# Patient Record
Sex: FEMALE | Race: WHITE | NOT HISPANIC OR LATINO | Employment: FULL TIME | URBAN - METROPOLITAN AREA
[De-identification: names, ages, dates, MRNs, and addresses within clinical notes are randomized per-mention and may not be internally consistent; named-entity substitution may affect disease eponyms.]

---

## 2024-05-28 ENCOUNTER — HOSPITAL ENCOUNTER (OUTPATIENT)
Facility: HOSPITAL | Age: 34
Discharge: HOME | End: 2024-05-28
Attending: EMERGENCY MEDICINE | Admitting: EMERGENCY MEDICINE
Payer: MEDICAID

## 2024-05-28 VITALS
HEART RATE: 86 BPM | TEMPERATURE: 98.3 F | WEIGHT: 171.2 LBS | SYSTOLIC BLOOD PRESSURE: 120 MMHG | OXYGEN SATURATION: 100 % | BODY MASS INDEX: 28.52 KG/M2 | HEIGHT: 65 IN | DIASTOLIC BLOOD PRESSURE: 69 MMHG | RESPIRATION RATE: 18 BRPM

## 2024-05-28 DIAGNOSIS — J01.00 ACUTE NON-RECURRENT MAXILLARY SINUSITIS: Primary | ICD-10-CM

## 2024-05-28 LAB
FLUAV SUBTYP SPEC NAA+PROBE: NOT DETECTED
FLUBV RNA ISLT QL NAA+PROBE: NOT DETECTED
SARS-COV-2 RNA RESP QL NAA+PROBE: NOT DETECTED
STREP A PCR: NOT DETECTED

## 2024-05-28 PROCEDURE — G0463 HOSPITAL OUTPT CLINIC VISIT: HCPCS

## 2024-05-28 PROCEDURE — 87651 STREP A DNA AMP PROBE: CPT | Performed by: EMERGENCY MEDICINE

## 2024-05-28 PROCEDURE — 87636 SARSCOV2 & INF A&B AMP PRB: CPT | Performed by: EMERGENCY MEDICINE

## 2024-05-28 PROCEDURE — 25010000002 KETOROLAC TROMETHAMINE PER 15 MG

## 2024-05-28 PROCEDURE — 99203 OFFICE O/P NEW LOW 30 MIN: CPT

## 2024-05-28 RX ORDER — DOXYCYCLINE 100 MG/1
100 CAPSULE ORAL 2 TIMES DAILY
Qty: 20 CAPSULE | Refills: 0 | Status: SHIPPED | OUTPATIENT
Start: 2024-05-28 | End: 2024-06-07

## 2024-05-28 RX ORDER — KETOROLAC TROMETHAMINE 30 MG/ML
30 INJECTION, SOLUTION INTRAMUSCULAR; INTRAVENOUS ONCE
Status: COMPLETED | OUTPATIENT
Start: 2024-05-28 | End: 2024-05-28

## 2024-05-28 RX ORDER — FLUTICASONE PROPIONATE 50 MCG
2 SPRAY, SUSPENSION (ML) NASAL DAILY
Qty: 11.1 ML | Refills: 0 | Status: SHIPPED | OUTPATIENT
Start: 2024-05-28

## 2024-05-28 RX ORDER — FLUCONAZOLE 150 MG/1
150 TABLET ORAL ONCE
Qty: 1 TABLET | Refills: 0 | Status: SHIPPED | OUTPATIENT
Start: 2024-05-28 | End: 2024-05-28

## 2024-05-28 RX ADMIN — KETOROLAC TROMETHAMINE 30 MG: 30 INJECTION, SOLUTION INTRAMUSCULAR at 13:50

## 2024-05-28 NOTE — FSED PROVIDER NOTE
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  She reports taking Tylenol this morning but continues to have headache pain.  She is denying vomiting and diarrhea.  She reports bilateral sinus pressure below her eyes.  She is on Vivitrol.        Review of Systems   All other systems reviewed and are negative.      History reviewed. No pertinent past medical history.    Allergies   Allergen Reactions    Amoxicillin Other (See Comments)     \"Flu like symptoms\"     Demerol [Meperidine] Other (See Comments)     \"Feels like fire in my veins\"       History reviewed. No pertinent surgical history.    History reviewed. No pertinent family history.    Social History     Socioeconomic History    Marital status:            Objective   Physical Exam  Vitals and nursing note reviewed.   HENT:      Head: Normocephalic and atraumatic.      Ears:      Comments: Tympanic membrane's bilaterally normal in appearance no swelling no redness noted     Nose: Congestion (Red nasal membranes clear discharge) present.      Mouth/Throat:      Mouth: Mucous membranes are moist.      Pharynx: Oropharynx is clear.   Eyes:      Conjunctiva/sclera: Conjunctivae normal.      Pupils: Pupils are equal, round, and reactive to light.   Cardiovascular:      Rate and Rhythm: Normal rate.      Heart sounds: Normal heart sounds.   Pulmonary:      Effort: Pulmonary effort is normal. No respiratory distress.      Breath sounds: Normal breath sounds. No stridor. No wheezing, rhonchi or rales.   Chest:      Chest wall: No tenderness.   Abdominal:      Palpations: Abdomen is soft.   Musculoskeletal:      Cervical back: Normal range of motion and neck supple.   Skin:     General: Skin is warm.   Neurological:      General: No focal deficit present. "      Mental Status: She is alert.         Procedures           ED Course  ED Course as of 05/28/24 1435   Tue May 28, 2024   1344 Strep is negative [WF]   1352 COVID influenza and strep are all negative [WF]      ED Course User Index  [WF] Rene Vazquez Jr., URSZULA                                           Medical Decision Making  Patient appears to have viral URI will provide injection of Toradol for her body aches    Problems Addressed:  Acute non-recurrent maxillary sinusitis: complicated acute illness or injury    Risk  Prescription drug management.        Final diagnoses:   Acute non-recurrent maxillary sinusitis       ED Disposition  ED Disposition       ED Disposition   Discharge    Condition   Stable    Comment   --               Provider, No Known  [facility] [place] [address]               Medication List        New Prescriptions      doxycycline 100 MG capsule  Commonly known as: MONODOX  Take 1 capsule by mouth 2 (Two) Times a Day for 10 days.     fluconazole 150 MG tablet  Commonly known as: DIFLUCAN  Take 1 tablet by mouth 1 (One) Time for 1 dose.     fluticasone 50 MCG/ACT nasal spray  Commonly known as: FLONASE  2 sprays into the nostril(s) as directed by provider Daily.               Where to Get Your Medications        These medications were sent to [facility] - [place], [place] - [address] - [phone]  - [phone] FX  [address] [place] [address]      Phone: [phone]   doxycycline 100 MG capsule  fluconazole 150 MG tablet  fluticasone 50 MCG/ACT nasal spray

## 2024-05-28 NOTE — DISCHARGE INSTRUCTIONS
Take ibuprofen and or Tylenol as needed for fever and body ache    Increase your fluid intake with Pedialyte Gatorade water    If you continue to have sinus pressure nasal congestion beyond 3 to 5 days start doxycycline antibiotic    Follow-up with your family doctor as needed return to ER for worsening symptom

## 2024-09-08 ENCOUNTER — HOSPITAL ENCOUNTER (EMERGENCY)
Facility: HOSPITAL | Age: 34
Discharge: HOME | End: 2024-09-08
Attending: EMERGENCY MEDICINE | Admitting: EMERGENCY MEDICINE
Payer: MEDICAID

## 2024-09-08 VITALS
RESPIRATION RATE: 18 BRPM | WEIGHT: 191 LBS | HEART RATE: 82 BPM | SYSTOLIC BLOOD PRESSURE: 140 MMHG | BODY MASS INDEX: 31.82 KG/M2 | OXYGEN SATURATION: 99 % | HEIGHT: 65 IN | TEMPERATURE: 98 F | DIASTOLIC BLOOD PRESSURE: 83 MMHG

## 2024-09-08 DIAGNOSIS — R51.9 ACUTE NONINTRACTABLE HEADACHE, UNSPECIFIED HEADACHE TYPE: Primary | ICD-10-CM

## 2024-09-08 LAB — HCG SERPL QL: NEGATIVE

## 2024-09-08 PROCEDURE — 25010000002 DIPHENHYDRAMINE PER 50 MG

## 2024-09-08 PROCEDURE — 25810000003 SODIUM CHLORIDE 0.9 % SOLUTION

## 2024-09-08 PROCEDURE — 96374 THER/PROPH/DIAG INJ IV PUSH: CPT

## 2024-09-08 PROCEDURE — 25010000002 METOCLOPRAMIDE PER 10 MG

## 2024-09-08 PROCEDURE — 96361 HYDRATE IV INFUSION ADD-ON: CPT

## 2024-09-08 PROCEDURE — 25010000002 DEXAMETHASONE SODIUM PHOSPHATE 10 MG/ML SOLUTION

## 2024-09-08 PROCEDURE — 84703 CHORIONIC GONADOTROPIN ASSAY: CPT

## 2024-09-08 PROCEDURE — 99283 EMERGENCY DEPT VISIT LOW MDM: CPT

## 2024-09-08 PROCEDURE — 25010000002 KETOROLAC TROMETHAMINE PER 15 MG

## 2024-09-08 PROCEDURE — 96375 TX/PRO/DX INJ NEW DRUG ADDON: CPT

## 2024-09-08 RX ORDER — DEXAMETHASONE SODIUM PHOSPHATE 10 MG/ML
10 INJECTION, SOLUTION INTRAMUSCULAR; INTRAVENOUS ONCE
Status: COMPLETED | OUTPATIENT
Start: 2024-09-08 | End: 2024-09-08

## 2024-09-08 RX ORDER — KETOROLAC TROMETHAMINE 30 MG/ML
15 INJECTION, SOLUTION INTRAMUSCULAR; INTRAVENOUS ONCE
Status: COMPLETED | OUTPATIENT
Start: 2024-09-08 | End: 2024-09-08

## 2024-09-08 RX ORDER — METOCLOPRAMIDE HYDROCHLORIDE 5 MG/ML
10 INJECTION INTRAMUSCULAR; INTRAVENOUS ONCE
Status: COMPLETED | OUTPATIENT
Start: 2024-09-08 | End: 2024-09-08

## 2024-09-08 RX ORDER — SODIUM CHLORIDE 0.9 % (FLUSH) 0.9 %
10 SYRINGE (ML) INJECTION AS NEEDED
Status: DISCONTINUED | OUTPATIENT
Start: 2024-09-08 | End: 2024-09-08 | Stop reason: HOSPADM

## 2024-09-08 RX ORDER — DIPHENHYDRAMINE HYDROCHLORIDE 50 MG/ML
25 INJECTION INTRAMUSCULAR; INTRAVENOUS ONCE
Status: COMPLETED | OUTPATIENT
Start: 2024-09-08 | End: 2024-09-08

## 2024-09-08 RX ADMIN — METOCLOPRAMIDE 10 MG: 5 INJECTION, SOLUTION INTRAMUSCULAR; INTRAVENOUS at 13:47

## 2024-09-08 RX ADMIN — KETOROLAC TROMETHAMINE 15 MG: 30 INJECTION, SOLUTION INTRAMUSCULAR at 13:47

## 2024-09-08 RX ADMIN — DIPHENHYDRAMINE HYDROCHLORIDE 25 MG: 50 INJECTION, SOLUTION INTRAMUSCULAR; INTRAVENOUS at 13:47

## 2024-09-08 RX ADMIN — SODIUM CHLORIDE 1000 ML: 9 INJECTION, SOLUTION INTRAVENOUS at 13:21

## 2024-09-08 RX ADMIN — DEXAMETHASONE SODIUM PHOSPHATE 10 MG: 10 INJECTION, SOLUTION INTRAMUSCULAR; INTRAVENOUS at 13:47

## 2024-09-08 NOTE — FSED PROVIDER NOTE
[facility]    EMERGENCY [facility] ENCOUNTER    Room Number:  DARIAN/DARIAN  Date seen:  9/8/2024  Time seen: 13:19 EDT  PCP: Provider, No Known  Historian: Patient    HPI:  Chief complaint: Migraine  Context:Amber Gooden is a 33 y.o. female who presents to the ED with c/o migraine.  Patient reports that she started to have a migraine last night.  Reports that she has been having some nausea and vomiting today.  She reports that she is light sensitive.  Patient reports history of migraines but reports that she does not have any medication to take for her migraines.  She reports that she has taken sumatriptan in the past.  Patient denies any vision changes, unilateral weakness, slurred speech.  Patient is nontoxic in appearance.  She is able to answer questions appropriately.    Timing: Constant  Duration: 1 day  Location: Head  Intensity/Severity: Moderate  Associated Symptoms: Migraine, nausea vomiting  Aggravating Factors: Light  Alleviating Factors: No known alleviating      MEDICAL RECORD REVIEW  Migraines    ALLERGIES  Amoxicillin and Demerol [meperidine]    PAST MEDICAL HISTORY  Active Ambulatory Problems     Diagnosis Date Noted    No Active Ambulatory Problems     Resolved Ambulatory Problems     Diagnosis Date Noted    No Resolved Ambulatory Problems     No Additional Past Medical History       PAST SURGICAL HISTORY  No past surgical history on file.    FAMILY HISTORY  No family history on file.    SOCIAL HISTORY  Social History     Socioeconomic History    Marital status:        REVIEW OF SYSTEMS  Review of Systems    All systems reviewed and negative except for those discussed in HPI.     PHYSICAL EXAM    I have reviewed the triage vital signs and nursing notes.    ED Triage Vitals [09/08/24 1255]   Temp Heart Rate Resp BP SpO2   98 °F (36.7 °C) 82 18 140/83 99 %      Temp src Heart Rate Source Patient Position BP Location FiO2 (%)   -- Monitor Sitting Right arm --        Physical Exam  Constitutional:       Appearance: Normal appearance. She is not toxic-appearing.   HENT:      Nose: Nose normal.      Mouth/Throat:      Mouth: Mucous membranes are moist.   Eyes:      Extraocular Movements: Extraocular movements intact.      Pupils: Pupils are equal, round, and reactive to light.   Cardiovascular:      Rate and Rhythm: Normal rate and regular rhythm.      Pulses: Normal pulses.      Heart sounds: Normal heart sounds.   Pulmonary:      Effort: Pulmonary effort is normal.      Breath sounds: Normal breath sounds.   Musculoskeletal:         General: Normal range of motion.      Cervical back: Normal range of motion. No rigidity or tenderness.   Skin:     General: Skin is warm.   Neurological:      General: No focal deficit present.      Mental Status: She is alert.      GCS: GCS eye subscore is 4. GCS verbal subscore is 5. GCS motor subscore is 6.      Sensory: Sensation is intact.      Motor: Motor function is intact.      Coordination: Coordination is intact.      Gait: Gait is intact.   Psychiatric:         Mood and Affect: Mood normal.         Behavior: Behavior normal.         Vital signs and nursing notes reviewed.        LAB RESULTS  Recent Results (from the past 24 hour(s))   hCG, Serum, Qualitative    Collection Time: 09/08/24  1:16 PM    Specimen: Blood   Result Value Ref Range    HCG Qualitative Negative Negative       Ordered the above labs and independently reviewed the results.      RADIOLOGY RESULTS  No Radiology Exams Resulted Within Past 24 Hours       I ordered the above noted radiological studies. Independently reviewed by me and discussed with radiologist.  See dictation above for official radiology interpretation.      Orders placed during this visit:  Orders Placed This Encounter   Procedures    hCG, Serum, Qualitative    Insert peripheral IV    ED Acknowledgement Form Needed;           PROCEDURES    Procedures        MEDICATIONS GIVEN IN ER    Medications    sodium chloride 0.9 % flush 10 mL (has no administration in time range)   sodium chloride 0.9 % bolus 1,000 mL (1,000 mL Intravenous New Bag 9/8/24 1321)   metoclopramide (REGLAN) injection 10 mg (10 mg Intravenous Given 9/8/24 1347)   diphenhydrAMINE (BENADRYL) injection 25 mg (25 mg Intravenous Given 9/8/24 1347)   ketorolac (TORADOL) injection 15 mg (15 mg Intravenous Given 9/8/24 1347)   dexAMETHasone sodium phosphate injection 10 mg (10 mg Intravenous Given 9/8/24 1347)         PROGRESS, DATA ANALYSIS, CONSULTS, AND MEDICAL DECISION MAKING    All labs and radiology studies have been independently reviewed by me.     ED Course as of 09/08/24 1446   Sun Sep 08, 2024   1344 HCG Qualitative: Negative [KJ]      ED Course User Index  [KJ] Candice Paige APRN       AS OF 14:46 EDT VITALS:    BP - 140/83  HR - 82  TEMP - 98 °F (36.7 °C)  02 SATS - 99%    Medical Decision Making  MEDICAL DECISION  Patient is a 33-year-old female who presents today with migraine.  Patient had an IV established and was given medication with IV fluids.  This patient presents with a headache most consistent with benign headache from either tension type headache vs migraine. No headache red flags. Neurologic exam without evidence of meningismus, AMS, focal neurologic findings so doubt meningitis, encephalitis, stroke. Presentation not consistent with acute intracranial bleed to include SAH (lack of risk factors, headache history). No history of trauma so doubt ICH. Given history and physical temporal arteritis unlikely, as is acute angle closure glaucoma. Doubt carotid artery dissection given no focal neuro deficits, no neck trauma or recent neck strain. Patient with no signs of increased intracranial pressure or weight loss and history and physical suggest more benign headache so less likely mass effect in brain from tumor or abscess or idiopathic intracranial hypertension. Pain was controlled with headache cocktail and patient  discharged home with PMD follow up.    Problems Addressed:  Acute nonintractable headache, unspecified headache type: complicated acute illness or injury    Amount and/or Complexity of Data Reviewed  Labs:  Decision-making details documented in ED Course.    Risk  Prescription drug management.          DIAGNOSIS  Final diagnoses:   Acute nonintractable headache, unspecified headache type       New Medications Ordered This Visit   Medications    sodium chloride 0.9 % flush 10 mL    sodium chloride 0.9 % bolus 1,000 mL    metoclopramide (REGLAN) injection 10 mg    diphenhydrAMINE (BENADRYL) injection 25 mg    ketorolac (TORADOL) injection 15 mg    dexAMETHasone sodium phosphate injection 10 mg           I performed hand hygiene on entry into the pt room and upon exit.      Part of this note may be an electronic transcription/translation of spoken language to printed text using the Dragon Dictation System.     Appropriate PPE worn during exam.    Dictated utilizing Fitlyon dictation     Note Disclaimer: At [facility], we believe that sharing information builds trust and better relationships. You are receiving this note because you recently visited [facility]. It is possible you will see health information before a provider has talked with you about it. This kind of information can be easy to misunderstand. To help you fully understand what it means for your health, we urge you to discuss this note with your provider.

## 2024-09-08 NOTE — DISCHARGE INSTRUCTIONS
Thank you for letting us care for you today.  Make sure you are drinking plenty fluids and getting rest today.  Follow-up with your primary care provider for continued evaluation.  Return the emergency room for any new or worsening symptoms.

## 2024-09-08 NOTE — Clinical Note
[facility] [place] [address] [place] [address] [place] [address]  Phone: [phone]    Amber Gooden was seen and treated in our emergency department on 9/8/2024.  She may return to work on 09/10/2024.         Thank you for choosing [facility].    Candice Paige APRN

## 2024-11-26 ENCOUNTER — HOSPITAL ENCOUNTER (EMERGENCY)
Facility: HOSPITAL | Age: 34
Discharge: HOME | End: 2024-11-26
Attending: EMERGENCY MEDICINE | Admitting: EMERGENCY MEDICINE
Payer: MEDICAID

## 2024-11-26 ENCOUNTER — APPOINTMENT (OUTPATIENT)
Dept: CT IMAGING | Facility: HOSPITAL | Age: 34
End: 2024-11-26
Payer: MEDICAID

## 2024-11-26 VITALS
WEIGHT: 192 LBS | TEMPERATURE: 98.7 F | DIASTOLIC BLOOD PRESSURE: 73 MMHG | HEART RATE: 78 BPM | RESPIRATION RATE: 16 BRPM | OXYGEN SATURATION: 100 % | BODY MASS INDEX: 31.99 KG/M2 | HEIGHT: 65 IN | SYSTOLIC BLOOD PRESSURE: 115 MMHG

## 2024-11-26 DIAGNOSIS — R10.9 LEFT FLANK PAIN: Primary | ICD-10-CM

## 2024-11-26 LAB
ALBUMIN SERPL-MCNC: 4.5 G/DL (ref 3.5–5.2)
ALBUMIN/GLOB SERPL: 1.8 G/DL
ALP SERPL-CCNC: 50 U/L (ref 39–117)
ALT SERPL W P-5'-P-CCNC: 34 U/L (ref 1–33)
ANION GAP SERPL CALCULATED.3IONS-SCNC: 9.1 MMOL/L (ref 5–15)
AST SERPL-CCNC: 18 U/L (ref 1–32)
B-HCG UR QL: NEGATIVE
BACTERIA UR QL AUTO: ABNORMAL /HPF
BASOPHILS # BLD AUTO: 0.03 10*3/MM3 (ref 0–0.2)
BASOPHILS NFR BLD AUTO: 0.2 % (ref 0–1.5)
BILIRUB SERPL-MCNC: <0.2 MG/DL (ref 0–1.2)
BILIRUB UR QL STRIP: NEGATIVE
BUN SERPL-MCNC: 13 MG/DL (ref 6–20)
BUN/CREAT SERPL: 17.6 (ref 7–25)
CALCIUM SPEC-SCNC: 9.5 MG/DL (ref 8.6–10.5)
CHLORIDE SERPL-SCNC: 103 MMOL/L (ref 98–107)
CLARITY UR: CLEAR
CO2 SERPL-SCNC: 24.9 MMOL/L (ref 22–29)
COLOR UR: YELLOW
CREAT SERPL-MCNC: 0.74 MG/DL (ref 0.57–1)
DEPRECATED RDW RBC AUTO: 42.2 FL (ref 37–54)
EGFRCR SERPLBLD CKD-EPI 2021: 109 ML/MIN/1.73
EOSINOPHIL # BLD AUTO: 0.29 10*3/MM3 (ref 0–0.4)
EOSINOPHIL NFR BLD AUTO: 2.2 % (ref 0.3–6.2)
ERYTHROCYTE [DISTWIDTH] IN BLOOD BY AUTOMATED COUNT: 12.8 % (ref 12.3–15.4)
GLOBULIN UR ELPH-MCNC: 2.5 GM/DL
GLUCOSE SERPL-MCNC: 88 MG/DL (ref 65–99)
GLUCOSE UR STRIP-MCNC: NEGATIVE MG/DL
HCT VFR BLD AUTO: 39.6 % (ref 34–46.6)
HGB BLD-MCNC: 12.7 G/DL (ref 12–15.9)
HGB UR QL STRIP.AUTO: NEGATIVE
HYALINE CASTS UR QL AUTO: ABNORMAL /LPF
IMM GRANULOCYTES # BLD AUTO: 0.03 10*3/MM3 (ref 0–0.05)
IMM GRANULOCYTES NFR BLD AUTO: 0.2 % (ref 0–0.5)
KETONES UR QL STRIP: NEGATIVE
LEUKOCYTE ESTERASE UR QL STRIP.AUTO: NEGATIVE
LIPASE SERPL-CCNC: 22 U/L (ref 13–60)
LYMPHOCYTES # BLD AUTO: 2.22 10*3/MM3 (ref 0.7–3.1)
LYMPHOCYTES NFR BLD AUTO: 16.6 % (ref 19.6–45.3)
MCH RBC QN AUTO: 28.7 PG (ref 26.6–33)
MCHC RBC AUTO-ENTMCNC: 32.1 G/DL (ref 31.5–35.7)
MCV RBC AUTO: 89.6 FL (ref 79–97)
MONOCYTES # BLD AUTO: 0.92 10*3/MM3 (ref 0.1–0.9)
MONOCYTES NFR BLD AUTO: 6.9 % (ref 5–12)
NEUTROPHILS NFR BLD AUTO: 73.9 % (ref 42.7–76)
NEUTROPHILS NFR BLD AUTO: 9.86 10*3/MM3 (ref 1.7–7)
NITRITE UR QL STRIP: NEGATIVE
PH UR STRIP.AUTO: 6 [PH] (ref 5–8)
PLATELET # BLD AUTO: 346 10*3/MM3 (ref 140–450)
PMV BLD AUTO: 10.3 FL (ref 6–12)
POTASSIUM SERPL-SCNC: 3.6 MMOL/L (ref 3.5–5.2)
PROT SERPL-MCNC: 7 G/DL (ref 6–8.5)
PROT UR QL STRIP: ABNORMAL
RBC # BLD AUTO: 4.42 10*6/MM3 (ref 3.77–5.28)
RBC # UR STRIP: ABNORMAL /HPF
REF LAB TEST METHOD: ABNORMAL
SODIUM SERPL-SCNC: 137 MMOL/L (ref 136–145)
SP GR UR STRIP: 1.02 (ref 1–1.03)
SQUAMOUS #/AREA URNS HPF: ABNORMAL /HPF
UROBILINOGEN UR QL STRIP: ABNORMAL
WBC # UR STRIP: ABNORMAL /HPF
WBC NRBC COR # BLD AUTO: 13.35 10*3/MM3 (ref 3.4–10.8)

## 2024-11-26 PROCEDURE — 96361 HYDRATE IV INFUSION ADD-ON: CPT

## 2024-11-26 PROCEDURE — 96374 THER/PROPH/DIAG INJ IV PUSH: CPT

## 2024-11-26 PROCEDURE — 81001 URINALYSIS AUTO W/SCOPE: CPT

## 2024-11-26 PROCEDURE — 25010000002 KETOROLAC TROMETHAMINE PER 15 MG

## 2024-11-26 PROCEDURE — 80053 COMPREHEN METABOLIC PANEL: CPT

## 2024-11-26 PROCEDURE — 25810000003 SODIUM CHLORIDE 0.9 % SOLUTION

## 2024-11-26 PROCEDURE — 25510000001 IOPAMIDOL PER 1 ML: Performed by: EMERGENCY MEDICINE

## 2024-11-26 PROCEDURE — 83690 ASSAY OF LIPASE: CPT

## 2024-11-26 PROCEDURE — 51798 US URINE CAPACITY MEASURE: CPT

## 2024-11-26 PROCEDURE — 81025 URINE PREGNANCY TEST: CPT | Performed by: EMERGENCY MEDICINE

## 2024-11-26 PROCEDURE — 85025 COMPLETE CBC W/AUTO DIFF WBC: CPT

## 2024-11-26 PROCEDURE — 74177 CT ABD & PELVIS W/CONTRAST: CPT

## 2024-11-26 PROCEDURE — 99285 EMERGENCY DEPT VISIT HI MDM: CPT

## 2024-11-26 RX ORDER — SODIUM CHLORIDE 0.9 % (FLUSH) 0.9 %
10 SYRINGE (ML) INJECTION AS NEEDED
Status: DISCONTINUED | OUTPATIENT
Start: 2024-11-26 | End: 2024-11-26 | Stop reason: HOSPADM

## 2024-11-26 RX ORDER — METHOCARBAMOL 750 MG/1
750 TABLET, FILM COATED ORAL 3 TIMES DAILY PRN
Qty: 12 TABLET | Refills: 0 | Status: SHIPPED | OUTPATIENT
Start: 2024-11-26

## 2024-11-26 RX ORDER — KETOROLAC TROMETHAMINE 30 MG/ML
15 INJECTION, SOLUTION INTRAMUSCULAR; INTRAVENOUS ONCE
Status: COMPLETED | OUTPATIENT
Start: 2024-11-26 | End: 2024-11-26

## 2024-11-26 RX ORDER — IOPAMIDOL 755 MG/ML
100 INJECTION, SOLUTION INTRAVASCULAR
Status: COMPLETED | OUTPATIENT
Start: 2024-11-26 | End: 2024-11-26

## 2024-11-26 RX ADMIN — IOPAMIDOL 100 ML: 755 INJECTION, SOLUTION INTRAVENOUS at 19:25

## 2024-11-26 RX ADMIN — KETOROLAC TROMETHAMINE 15 MG: 30 INJECTION, SOLUTION INTRAMUSCULAR at 19:56

## 2024-11-26 RX ADMIN — SODIUM CHLORIDE 1000 ML: 9 INJECTION, SOLUTION INTRAVENOUS at 19:30

## 2024-11-27 NOTE — DISCHARGE INSTRUCTIONS
You can use Tylenol and Motrin as needed for pain. You can use the Robaxin as prescribed. Make sure you are drinking plenty of fluids.  Follow-up with the patient care connection to establish a primary care provider.  You can use ice or heat to the sore area for 20 minutes at a time.  Return to the emergency room for any fever, worsening pain, difficulty urinating or any other concerning symptoms.

## 2024-11-27 NOTE — FSED PROVIDER NOTE
[facility]    EMERGENCY DEPARTMENT ENCOUNTER    Room Number:  04/04  Date seen:  11/26/2024  Time seen: 20:30 EST  PCP: Provider, No Known  Historian: Patient    HPI:  Chief complaint: Left flank pain  Context:Amber Gooden is a 33 y.o. female who presents to the ED with c/o left flank pain.  Patient reports that she has been having left flank pain and burning with urination for the last week.  She reports that she feels like she may have a kidney infection and was concerned.  She reports that she has been drinking a lot of fluids but it does not seem to be helping.  Patient denies any alleviating or exacerbating factors to the flank pain.  She denies any nausea vomiting diarrhea.  Patient denies any radiation of pain.  She reports the pain is intermittent.    Timing: Intermittent  Duration: 1 week  Location: Left flank  Intensity/Severity: Moderate  Associated Symptoms:left flank pain, dysuria      MEDICAL RECORD REVIEW  Seasonal allergies    ALLERGIES  Amoxicillin and Demerol [meperidine]    PAST MEDICAL HISTORY  Active Ambulatory Problems     Diagnosis Date Noted    No Active Ambulatory Problems     Resolved Ambulatory Problems     Diagnosis Date Noted    No Resolved Ambulatory Problems     No Additional Past Medical History       PAST SURGICAL HISTORY  History reviewed. No pertinent surgical history.    FAMILY HISTORY  History reviewed. No pertinent family history.    SOCIAL HISTORY  Social History     Socioeconomic History    Marital status:        REVIEW OF SYSTEMS  Review of Systems    All systems reviewed and negative except for those discussed in HPI.     PHYSICAL EXAM    I have reviewed the triage vital signs and nursing notes.    ED Triage Vitals [11/26/24 1755]   Temp Heart Rate Resp BP SpO2   98.7 °F (37.1 °C) 88 16 125/77 99 %      Temp src Heart Rate Source Patient Position BP Location FiO2 (%)   Oral Monitor Sitting Right arm --       Physical  Exam  Constitutional:       Appearance: Normal appearance. She is not toxic-appearing.   HENT:      Nose: Nose normal.      Mouth/Throat:      Mouth: Mucous membranes are moist.   Eyes:      Extraocular Movements: Extraocular movements intact.      Conjunctiva/sclera: Conjunctivae normal.      Pupils: Pupils are equal, round, and reactive to light.   Cardiovascular:      Rate and Rhythm: Normal rate and regular rhythm.      Pulses: Normal pulses.      Heart sounds: Normal heart sounds.   Pulmonary:      Effort: Pulmonary effort is normal.      Breath sounds: Normal breath sounds.   Abdominal:      General: Bowel sounds are normal.      Palpations: Abdomen is soft.      Tenderness: There is no abdominal tenderness. There is no right CVA tenderness, left CVA tenderness, guarding or rebound.   Musculoskeletal:         General: Normal range of motion.      Cervical back: Normal range of motion.   Skin:     General: Skin is warm.   Neurological:      General: No focal deficit present.      Mental Status: She is alert.   Psychiatric:         Mood and Affect: Mood normal.         Behavior: Behavior normal.         Vital signs and nursing notes reviewed.        LAB RESULTS  Recent Results (from the past 24 hours)   Urinalysis without microscopic (no culture) - Urine, Clean Catch    Collection Time: 11/26/24  5:57 PM    Specimen: Urine, Clean Catch   Result Value Ref Range    Color, UA Yellow Yellow, Straw    Appearance, UA Clear Clear    pH, UA 6.0 5.0 - 8.0    Specific Gravity, UA 1.025 1.005 - 1.030    Glucose, UA Negative Negative    Ketones, UA Negative Negative    Bilirubin, UA Negative Negative    Blood, UA Negative Negative    Protein, UA Trace (A) Negative    Leuk Esterase, UA Negative Negative    Nitrite, UA Negative Negative    Urobilinogen, UA 0.2 E.U./dL 0.2 - 1.0 E.U./dL   Pregnancy, Urine - Urine, Clean Catch    Collection Time: 11/26/24  5:57 PM    Specimen: Urine, Clean Catch   Result Value Ref Range    HCG,  Urine QL Negative Negative   Comprehensive Metabolic Panel    Collection Time: 11/26/24  6:50 PM    Specimen: Blood   Result Value Ref Range    Glucose 88 65 - 99 mg/dL    BUN 13 6 - 20 mg/dL    Creatinine 0.74 0.57 - 1.00 mg/dL    Sodium 137 136 - 145 mmol/L    Potassium 3.6 3.5 - 5.2 mmol/L    Chloride 103 98 - 107 mmol/L    CO2 24.9 22.0 - 29.0 mmol/L    Calcium 9.5 8.6 - 10.5 mg/dL    Total Protein 7.0 6.0 - 8.5 g/dL    Albumin 4.5 3.5 - 5.2 g/dL    ALT (SGPT) 34 (H) 1 - 33 U/L    AST (SGOT) 18 1 - 32 U/L    Alkaline Phosphatase 50 39 - 117 U/L    Total Bilirubin <0.2 0.0 - 1.2 mg/dL    Globulin 2.5 gm/dL    A/G Ratio 1.8 g/dL    BUN/Creatinine Ratio 17.6 7.0 - 25.0    Anion Gap 9.1 5.0 - 15.0 mmol/L    eGFR 109.0 >60.0 mL/min/1.73   Lipase    Collection Time: 11/26/24  6:50 PM    Specimen: Blood   Result Value Ref Range    Lipase 22 13 - 60 U/L   CBC Auto Differential    Collection Time: 11/26/24  6:50 PM    Specimen: Blood   Result Value Ref Range    WBC 13.35 (H) 3.40 - 10.80 10*3/mm3    RBC 4.42 3.77 - 5.28 10*6/mm3    Hemoglobin 12.7 12.0 - 15.9 g/dL    Hematocrit 39.6 34.0 - 46.6 %    MCV 89.6 79.0 - 97.0 fL    MCH 28.7 26.6 - 33.0 pg    MCHC 32.1 31.5 - 35.7 g/dL    RDW 12.8 12.3 - 15.4 %    RDW-SD 42.2 37.0 - 54.0 fl    MPV 10.3 6.0 - 12.0 fL    Platelets 346 140 - 450 10*3/mm3    Neutrophil % 73.9 42.7 - 76.0 %    Lymphocyte % 16.6 (L) 19.6 - 45.3 %    Monocyte % 6.9 5.0 - 12.0 %    Eosinophil % 2.2 0.3 - 6.2 %    Basophil % 0.2 0.0 - 1.5 %    Immature Grans % 0.2 0.0 - 0.5 %    Neutrophils, Absolute 9.86 (H) 1.70 - 7.00 10*3/mm3    Lymphocytes, Absolute 2.22 0.70 - 3.10 10*3/mm3    Monocytes, Absolute 0.92 (H) 0.10 - 0.90 10*3/mm3    Eosinophils, Absolute 0.29 0.00 - 0.40 10*3/mm3    Basophils, Absolute 0.03 0.00 - 0.20 10*3/mm3    Immature Grans, Absolute 0.03 0.00 - 0.05 10*3/mm3       Ordered the above labs and independently reviewed the results.      RADIOLOGY RESULTS  CT Abdomen Pelvis With  Contrast    Result Date: 11/26/2024  CT ABDOMEN PELVIS W CONTRAST Date of Exam: 11/26/2024 7:13 PM EST Indication: back pain, urinary symptoms. Comparison: None available. Technique: Axial CT images were obtained of the abdomen and pelvis following the uneventful intravenous administration of iodinated contrast. Sagittal and coronal reconstructions were performed.  Automated exposure control and iterative reconstruction methods were used. FINDINGS: Lung bases: No masses. No consolidation. Liver:No masses. No intrahepatic biliary ductal dilatation. Spleen:No masses. No perisplenic hematoma. Pancreas:No pancreatic masses. No evidence of pancreatitis. Gallbladder and common bile duct:No evidence of cholelithiasis. No evidence of cholecystitis. Adrenal glands:No adrenal masses Kidneys and ureters:No kidney stones. No renal masses.No calculi present within the ureters. Normal caliber ureters. Urinary bladder:No urinary bladder wall thickening. No bladder masses. Small bowel:Normal caliber small bowel. Large bowel:No diverticulosis or diverticulitis. No large bowel masses are appreciated Appendix: Normal GENITOURINARY: Normal appearance of the uterus and adnexa. Ascites or pneumoperitoneum:None. Adenopathy:None present Osseous structures: The pubic bones are intact. The proximal femurs are intact. The sacrum and sacroiliac joints are normal. The spinous and transverse processes are intact.. Other findings: None     No acute abdominal or pelvic pathology. Electronically Signed: Royal Yang MD  11/26/2024 7:41 PM EST  Workstation ID: [id number]        I ordered the above noted radiological studies. Independently reviewed by me and discussed with radiologist.  See dictation above for official radiology interpretation.      Orders placed during this visit:  Orders Placed This Encounter   Procedures    CT Abdomen Pelvis With Contrast    Urinalysis without microscopic (no culture) - Urine, Clean Catch    Pregnancy, Urine -  Urine, Clean Catch    Comprehensive Metabolic Panel    Lipase    CBC Auto Differential    [place] Urine Culture Tube -    Urinalysis, Microscopic Only - Urine, Clean Catch    Ambulatory Referral to Family Practice    NPO Diet NPO Type: Strict NPO    Undress & Gown    Bladder scan    Insert Peripheral IV    CBC & Differential    ED Acknowledgement Form Needed;           PROCEDURES    Procedures        MEDICATIONS GIVEN IN ER    Medications   sodium chloride 0.9 % flush 10 mL (has no administration in time range)   sodium chloride 0.9 % bolus 1,000 mL (1,000 mL Intravenous New Bag 11/26/24 1930)   iopamidol (ISOVUE-370) 76 % injection 100 mL (100 mL Intravenous Given 11/26/24 1925)   ketorolac (TORADOL) injection 15 mg (15 mg Intravenous Given 11/26/24 1956)         PROGRESS, DATA ANALYSIS, CONSULTS, AND MEDICAL DECISION MAKING    All labs and radiology studies have been independently reviewed by me.     ED Course as of 11/26/24 2035   Tue Nov 26, 2024   1856 WBC(!): 13.35 [KJ]      ED Course User Index  [KJ] Candice Paige, URSZULA       AS OF 20:35 EST VITALS:    BP - 115/73  HR - 78  TEMP - 98.7 °F (37.1 °C) (Oral)  02 SATS - 100%    Medical Decision Making  Patient is a 33 year old female who presents with left flank pain. Patient had an IV established and blood work was obtained. Given history, exam, and work up I have low suspicion for atypical appendicitis, acute cholecystitis, AAA, infected obstructed stone, pyelonephritis, or other emergent intraabdominal pathology. Symptoms and UA indicate no infection.  Patient had a normal CT scan.  Patient was given IV fluids and Toradol.  I will send the patient home with a prescription for Robaxin and have her follow-up with her primary care provider.  She was given return precautions with understanding.    Problems Addressed:  Left flank pain: complicated acute illness or injury    Amount and/or Complexity of Data Reviewed  Labs: ordered. Decision-making details  documented in ED Course.  Radiology: ordered.    Risk  Prescription drug management.          DIAGNOSIS  Final diagnoses:   Left flank pain       New Medications Ordered This Visit   Medications    sodium chloride 0.9 % flush 10 mL    sodium chloride 0.9 % bolus 1,000 mL    iopamidol (ISOVUE-370) 76 % injection 100 mL    ketorolac (TORADOL) injection 15 mg    methocarbamol (ROBAXIN) 750 MG tablet     Sig: Take 1 tablet by mouth 3 (Three) Times a Day As Needed for Muscle Spasms.     Dispense:  12 tablet     Refill:  0           I performed hand hygiene on entry into the pt room and upon exit.      Part of this note may be an electronic transcription/translation of spoken language to printed text using the Dragon Dictation System.     Appropriate PPE worn during exam.    Dictated utilizing Dragon dictation     Note Disclaimer: At [facility], we believe that sharing information builds trust and better relationships. You are receiving this note because you recently visited [facility]. It is possible you will see health information before a provider has talked with you about it. This kind of information can be easy to misunderstand. To help you fully understand what it means for your health, we urge you to discuss this note with your provider.

## 2024-12-17 ENCOUNTER — LAB (OUTPATIENT)
Dept: FAMILY MEDICINE CLINIC | Facility: CLINIC | Age: 34
End: 2024-12-17
Payer: MEDICAID

## 2024-12-17 ENCOUNTER — OFFICE VISIT (OUTPATIENT)
Dept: FAMILY MEDICINE CLINIC | Facility: CLINIC | Age: 34
End: 2024-12-17
Payer: MEDICAID

## 2024-12-17 VITALS
BODY MASS INDEX: 34.89 KG/M2 | WEIGHT: 209.4 LBS | SYSTOLIC BLOOD PRESSURE: 100 MMHG | RESPIRATION RATE: 16 BRPM | DIASTOLIC BLOOD PRESSURE: 64 MMHG | OXYGEN SATURATION: 98 % | HEIGHT: 65 IN | TEMPERATURE: 98.5 F | HEART RATE: 68 BPM

## 2024-12-17 DIAGNOSIS — Z00.00 HEALTHCARE MAINTENANCE: ICD-10-CM

## 2024-12-17 DIAGNOSIS — Z00.00 HEALTHCARE MAINTENANCE: Primary | ICD-10-CM

## 2024-12-17 DIAGNOSIS — Z87.19 H/O CHRONIC HEPATITIS: ICD-10-CM

## 2024-12-17 DIAGNOSIS — F34.1 PERSISTENT DEPRESSIVE DISORDER: ICD-10-CM

## 2024-12-17 DIAGNOSIS — F43.10 PTSD (POST-TRAUMATIC STRESS DISORDER): ICD-10-CM

## 2024-12-17 DIAGNOSIS — F39 MOOD DISORDER: ICD-10-CM

## 2024-12-17 DIAGNOSIS — F51.01 PRIMARY INSOMNIA: ICD-10-CM

## 2024-12-17 DIAGNOSIS — F41.9 ANXIETY: ICD-10-CM

## 2024-12-17 DIAGNOSIS — G43.801 OTHER MIGRAINE WITH STATUS MIGRAINOSUS, NOT INTRACTABLE: ICD-10-CM

## 2024-12-17 DIAGNOSIS — F10.21 ALCOHOLISM IN RECOVERY: ICD-10-CM

## 2024-12-17 PROBLEM — G43.901 MIGRAINE WITH STATUS MIGRAINOSUS, NOT INTRACTABLE: Status: ACTIVE | Noted: 2024-12-17

## 2024-12-17 LAB
BASOPHILS # BLD AUTO: 0.04 10*3/MM3 (ref 0–0.2)
BASOPHILS NFR BLD AUTO: 0.5 % (ref 0–1.5)
BILIRUB UR QL STRIP: NEGATIVE
CLARITY UR: CLEAR
COLOR UR: YELLOW
DEPRECATED RDW RBC AUTO: 39.8 FL (ref 37–54)
EOSINOPHIL # BLD AUTO: 0.32 10*3/MM3 (ref 0–0.4)
EOSINOPHIL NFR BLD AUTO: 3.7 % (ref 0.3–6.2)
ERYTHROCYTE [DISTWIDTH] IN BLOOD BY AUTOMATED COUNT: 12.4 % (ref 12.3–15.4)
GLUCOSE UR STRIP-MCNC: NEGATIVE MG/DL
HCT VFR BLD AUTO: 41 % (ref 34–46.6)
HGB BLD-MCNC: 13.5 G/DL (ref 12–15.9)
HGB UR QL STRIP.AUTO: NEGATIVE
HOLD SPECIMEN: NORMAL
IMM GRANULOCYTES # BLD AUTO: 0.03 10*3/MM3 (ref 0–0.05)
IMM GRANULOCYTES NFR BLD AUTO: 0.4 % (ref 0–0.5)
KETONES UR QL STRIP: NEGATIVE
LEUKOCYTE ESTERASE UR QL STRIP.AUTO: NEGATIVE
LYMPHOCYTES # BLD AUTO: 2.05 10*3/MM3 (ref 0.7–3.1)
LYMPHOCYTES NFR BLD AUTO: 23.9 % (ref 19.6–45.3)
MCH RBC QN AUTO: 29.1 PG (ref 26.6–33)
MCHC RBC AUTO-ENTMCNC: 32.9 G/DL (ref 31.5–35.7)
MCV RBC AUTO: 88.4 FL (ref 79–97)
MONOCYTES # BLD AUTO: 0.86 10*3/MM3 (ref 0.1–0.9)
MONOCYTES NFR BLD AUTO: 10 % (ref 5–12)
NEUTROPHILS NFR BLD AUTO: 5.26 10*3/MM3 (ref 1.7–7)
NEUTROPHILS NFR BLD AUTO: 61.5 % (ref 42.7–76)
NITRITE UR QL STRIP: NEGATIVE
NRBC BLD AUTO-RTO: 0 /100 WBC (ref 0–0.2)
PH UR STRIP.AUTO: 7 [PH] (ref 5–8)
PLATELET # BLD AUTO: 280 10*3/MM3 (ref 140–450)
PMV BLD AUTO: 11.9 FL (ref 6–12)
PROT UR QL STRIP: NEGATIVE
RBC # BLD AUTO: 4.64 10*6/MM3 (ref 3.77–5.28)
SP GR UR STRIP: 1.01 (ref 1–1.03)
UROBILINOGEN UR QL STRIP: NORMAL
WBC NRBC COR # BLD AUTO: 8.56 10*3/MM3 (ref 3.4–10.8)

## 2024-12-17 PROCEDURE — 80053 COMPREHEN METABOLIC PANEL: CPT | Performed by: NURSE PRACTITIONER

## 2024-12-17 PROCEDURE — 81003 URINALYSIS AUTO W/O SCOPE: CPT | Performed by: NURSE PRACTITIONER

## 2024-12-17 PROCEDURE — 85025 COMPLETE CBC W/AUTO DIFF WBC: CPT | Performed by: NURSE PRACTITIONER

## 2024-12-17 PROCEDURE — 87522 HEPATITIS C REVRS TRNSCRPJ: CPT | Performed by: NURSE PRACTITIONER

## 2024-12-17 PROCEDURE — 82306 VITAMIN D 25 HYDROXY: CPT | Performed by: NURSE PRACTITIONER

## 2024-12-17 PROCEDURE — 84466 ASSAY OF TRANSFERRIN: CPT | Performed by: NURSE PRACTITIONER

## 2024-12-17 PROCEDURE — 83540 ASSAY OF IRON: CPT | Performed by: NURSE PRACTITIONER

## 2024-12-17 PROCEDURE — 83036 HEMOGLOBIN GLYCOSYLATED A1C: CPT | Performed by: NURSE PRACTITIONER

## 2024-12-17 PROCEDURE — 36415 COLL VENOUS BLD VENIPUNCTURE: CPT | Performed by: NURSE PRACTITIONER

## 2024-12-17 PROCEDURE — 80061 LIPID PANEL: CPT | Performed by: NURSE PRACTITIONER

## 2024-12-17 PROCEDURE — 82607 VITAMIN B-12: CPT | Performed by: NURSE PRACTITIONER

## 2024-12-17 PROCEDURE — 82728 ASSAY OF FERRITIN: CPT | Performed by: NURSE PRACTITIONER

## 2024-12-17 RX ORDER — CLONIDINE HYDROCHLORIDE 0.1 MG/1
0.1 TABLET ORAL
COMMUNITY
Start: 2024-11-20

## 2024-12-17 RX ORDER — VILOXAZINE HYDROCHLORIDE 100 MG/1
1 CAPSULE, EXTENDED RELEASE ORAL DAILY
COMMUNITY
Start: 2024-09-11 | End: 2024-12-17

## 2024-12-17 RX ORDER — SUMATRIPTAN 50 MG/1
TABLET, FILM COATED ORAL
COMMUNITY
Start: 2024-09-24

## 2024-12-17 RX ORDER — GABAPENTIN 300 MG/1
1 CAPSULE ORAL DAILY
COMMUNITY
Start: 2024-11-20

## 2024-12-17 RX ORDER — DOCUSATE SODIUM 100 MG/1
100 CAPSULE, LIQUID FILLED ORAL 2 TIMES DAILY PRN
COMMUNITY
Start: 2024-11-20

## 2024-12-17 RX ORDER — HYDROXYZINE PAMOATE 50 MG/1
1 CAPSULE ORAL 3 TIMES DAILY
COMMUNITY
Start: 2024-11-20

## 2024-12-17 RX ORDER — NALTREXONE HYDROCHLORIDE 50 MG/1
1 TABLET, FILM COATED ORAL DAILY
COMMUNITY
Start: 2024-08-30 | End: 2024-12-17

## 2024-12-17 RX ORDER — BREXPIPRAZOLE 2 MG/1
1 TABLET ORAL DAILY
COMMUNITY
Start: 2024-11-20

## 2024-12-17 RX ORDER — CETIRIZINE HYDROCHLORIDE 10 MG/1
10 TABLET ORAL AS NEEDED
COMMUNITY
Start: 2024-10-30

## 2024-12-17 RX ORDER — NALTREXONE 380 MG
KIT INTRAMUSCULAR
COMMUNITY
Start: 2024-12-02

## 2024-12-17 RX ORDER — PRAZOSIN HYDROCHLORIDE 2 MG/1
2 CAPSULE ORAL
COMMUNITY
Start: 2024-11-20

## 2024-12-17 NOTE — PROGRESS NOTES
"Chief Complaint  Establish Care and Flank Pain (States that this was the reason for appt but that this has resolved. )    Subjective        Amber Gooden presents to [facility]  History of Present Illness  32 y/o  female presents to  office, to establish care.  Patient reports h/o 17+ years of alcoholism and cocaine.  She has been 8+ months drug and alcohol free.  She reports drug of choice was cocaine, but admits to multi-substance abuse.  Patient states that she was in half-way for 8 months for drug related charges starting end of 2022.  Patient states that she was born and raised in [place] and moved down to [place] about 8 months ago.  She did rehab at [facility], then transitioned to [facility] and is currently in a sober living house.  Patient states that she has a mental health provider that she meets via telehealth - DENISE (Aviva Miller, Addiction Specialist).  She reports monthly meeting with Aviva Miller.  Flank Pain  This is a new problem. The current episode started 1 to 4 weeks ago. The problem occurs intermittently. The problem has been improved since onset. The quality of the pain is described as shooting and stabbing. The patient is experiencing no pain. The symptoms are aggravated by twisting and position. Pertinent negatives include no abdominal pain, bladder incontinence, bowel incontinence, dysuria, fever or pelvic pain. Risk factors include obesity. She has tried heat, bed rest, home exercises and NSAIDs for the symptoms. The treatment provided moderate relief.       Objective   Vital Signs:  /64 (BP Location: Left arm, Patient Position: Sitting, Cuff Size: Adult)   Pulse 68   Temp 98.5 °F (36.9 °C) (Oral)   Resp 16   Ht 165.1 cm (65\")   Wt 95 kg (209 lb 6.4 oz)   SpO2 98%   BMI 34.85 kg/m²   Estimated body mass index is 34.85 kg/m² as calculated from the following:    Height as of this encounter: 165.1 cm (65\").    Weight as of this " encounter: 95 kg (209 lb 6.4 oz).      Physical Exam  Vitals reviewed.   Constitutional:       General: She is not in acute distress.     Appearance: Normal appearance. She is not ill-appearing, toxic-appearing or diaphoretic.   HENT:      Head: Normocephalic and atraumatic.      Right Ear: Tympanic membrane, ear canal and external ear normal.      Left Ear: Tympanic membrane, ear canal and external ear normal.      Nose: Nose normal. No congestion or rhinorrhea.      Mouth/Throat:      Mouth: Mucous membranes are moist.      Pharynx: Oropharynx is clear.   Eyes:      Extraocular Movements: Extraocular movements intact.      Conjunctiva/sclera: Conjunctivae normal.      Pupils: Pupils are equal, round, and reactive to light.   Cardiovascular:      Rate and Rhythm: Normal rate and regular rhythm.      Pulses: Normal pulses.      Heart sounds: Normal heart sounds.   Pulmonary:      Effort: Pulmonary effort is normal.      Breath sounds: Normal breath sounds. No wheezing, rhonchi or rales.   Abdominal:      General: Bowel sounds are normal.      Palpations: Abdomen is soft.      Tenderness: There is no abdominal tenderness. There is no right CVA tenderness, left CVA tenderness, guarding or rebound.   Musculoskeletal:         General: Normal range of motion.      Right lower leg: No edema.      Left lower leg: No edema.   Skin:     General: Skin is warm and dry.      Capillary Refill: Capillary refill takes less than 2 seconds.   Neurological:      General: No focal deficit present.      Mental Status: She is alert and oriented to person, place, and time.      Motor: No weakness.      Gait: Gait normal.   Psychiatric:         Mood and Affect: Mood normal.         Behavior: Behavior normal.         Thought Content: Thought content normal.         Judgment: Judgment normal.        Result Review :         Assessment and Plan   Diagnoses and all orders for this visit:    1. Healthcare maintenance (Primary)  -     CBC &  Differential  -     Comprehensive Metabolic Panel  -     Lipid Panel  -     Urinalysis With Culture If Indicated - Urine, Clean Catch  -     Hemoglobin A1c; Future  -     Ferritin  -     Iron Profile  -     Vitamin D,25-Hydroxy  -     Vitamin B12  -     Hepatitis C RNA, Quantitative, PCR (graph)  -     Ambulatory Referral to Gynecology  -     Fluzone >6mos ([id number])  -     Mammo Screening Digital Tomosynthesis Bilateral With CAD; Future  -     Cancel: POC Pregnancy, Urine  -     Pregnancy, Urine - Urine, Clean Catch; Future  -     [place] Urine Culture Tube - Urine, Clean Catch    2. H/O chronic hepatitis  Assessment & Plan:  Patient reports h/o Hep C.    Orders:  -     Hepatitis C RNA, Quantitative, PCR (graph)    3. Alcoholism in recovery  Assessment & Plan:  Continue monthly Vivitrol, as directed and prescribed by Mental Health Provider.      4. Anxiety  Assessment & Plan:  Continue Clonidine and hydroxyzine, as directed and prescribed by Mental Health provider.      5. Mood disorder  Assessment & Plan:  Continue gabapentin, as directed and prescribed by Mental Health Provider.      6. Persistent depressive disorder  Assessment & Plan:  Patient's depression is a recurrent episode that is moderate without psychosis. Depression is active and improving with treatment.    Plan:   Continue current medication therapy   Rexulti    Followup in 2 weeks.       7. PTSD (post-traumatic stress disorder)  Assessment & Plan:  Psychological condition is improving with treatment.  Continue current treatment regimen.  Psychological condition  will be reassessed in 2 weeks.    Continue Prazosin, rexulti, clonidine, as directed and hydroxyzine, as needed.      8. Other migraine with status migrainosus, not intractable  Assessment & Plan:  Headaches are stable.    Plan:  Continue same medication/s without change.     Discussed medication dosage, use, side effects, and goals of treatment in detail.    Discussed monitoring symptoms  and use of quick-relief medications and maintenance medication.  Counseled patient on lifestyle modifications to help control headaches.     General Treatment Goals:   symptom prevention  minimize work absence  minimizing limitation in activity  prevention of exacerbations  decrease use of ER/inpatient care  minimization of adverse effects of treatment    Followup in 6 months      IMITREX, as directed.            9. Primary insomnia  Assessment & Plan:  Continue Prazosin, clonidine, as directed and hydroxyzine, as needed.        Current Outpatient Medications on File Prior to Visit   Medication Sig Dispense Refill    cetirizine (zyrTEC) 10 MG tablet Take 1 tablet by mouth As Needed.      cloNIDine (CATAPRES) 0.1 MG tablet Take 1 tablet by mouth every night at bedtime.      docusate sodium (COLACE) 100 MG capsule Take 1 capsule by mouth 2 (Two) Times a Day As Needed for Constipation.      gabapentin (NEURONTIN) 300 MG capsule Take 1 capsule by mouth Daily.      hydrOXYzine pamoate (VISTARIL) 50 MG capsule Take 1 capsule by mouth 3 times a day.      prazosin (MINIPRESS) 2 MG capsule Take 1 capsule by mouth every night at bedtime.      Rexulti 2 MG tablet Take 1 tablet by mouth Daily.      SUMAtriptan (IMITREX) 50 MG tablet TAKE 1 TABLET BY MOUTH AS DIRECTED. MAY REPEAT DOSE AFTER 1 HOUR NO MORE THAN 2 TABLETS IN 1 DAY      Vivitrol 380 MG reconstituted suspension IM suspension INJECT 380MG AS AN INTRAMUSCULAR GLUTEAL INJECTION EVERY 28 DAYS      [DISCONTINUED] fluticasone (FLONASE) 50 MCG/ACT nasal spray 2 sprays into the nostril(s) as directed by provider Daily. (Patient not taking: Reported on 12/17/2024) 11.1 mL 0    [DISCONTINUED] methocarbamol (ROBAXIN) 750 MG tablet Take 1 tablet by mouth 3 (Three) Times a Day As Needed for Muscle Spasms. (Patient not taking: Reported on 12/17/2024) 12 tablet 0    [DISCONTINUED] naltrexone (DEPADE) 50 MG tablet Take 1 tablet by mouth Daily. (Patient not taking: Reported on  12/17/2024)      [DISCONTINUED] Qelbree 100 MG capsule sustained-release 24 hr Take 1 capsule by mouth Daily. (Patient not taking: Reported on 12/17/2024)       No current facility-administered medications on file prior to visit.           I spent 45 minutes caring for  on this date of service. This time includes time spent by me in the following activities:obtaining and/or reviewing a separately obtained history, performing a medically appropriate examination and/or evaluation , counseling and educating the patient/family/caregiver, ordering medications, tests, or procedures, referring and communicating with other health care professionals , and documenting information in the medical record  Follow Up   Return in about 20 days (around 1/6/2025).  Patient was given instructions and counseling regarding her condition or for health maintenance advice. Please see specific information pulled into the AVS if appropriate.

## 2024-12-17 NOTE — ASSESSMENT & PLAN NOTE
Headaches are stable.    Plan:  Continue same medication/s without change.     Discussed medication dosage, use, side effects, and goals of treatment in detail.    Discussed monitoring symptoms and use of quick-relief medications and maintenance medication.  Counseled patient on lifestyle modifications to help control headaches.     General Treatment Goals:   symptom prevention  minimize work absence  minimizing limitation in activity  prevention of exacerbations  decrease use of ER/inpatient care  minimization of adverse effects of treatment    Followup in 6 months      IMITREX, as directed.

## 2024-12-17 NOTE — ASSESSMENT & PLAN NOTE
Patient's depression is a recurrent episode that is moderate without psychosis. Depression is active and improving with treatment.    Plan:   Continue current medication therapy   Rexulti    Followup in 2 weeks.

## 2024-12-17 NOTE — ASSESSMENT & PLAN NOTE
Psychological condition is improving with treatment.  Continue current treatment regimen.  Psychological condition  will be reassessed in 2 weeks.    Continue Prazosin, rexulti, clonidine, as directed and hydroxyzine, as needed.

## 2024-12-18 LAB
25(OH)D3 SERPL-MCNC: 26.9 NG/ML (ref 30–100)
ALBUMIN SERPL-MCNC: 4.6 G/DL (ref 3.5–5.2)
ALBUMIN/GLOB SERPL: 1.7 G/DL
ALP SERPL-CCNC: 47 U/L (ref 39–117)
ALT SERPL W P-5'-P-CCNC: 44 U/L (ref 1–33)
ANION GAP SERPL CALCULATED.3IONS-SCNC: 12 MMOL/L (ref 5–15)
AST SERPL-CCNC: 34 U/L (ref 1–32)
BILIRUB SERPL-MCNC: 0.3 MG/DL (ref 0–1.2)
BUN SERPL-MCNC: 12 MG/DL (ref 6–20)
BUN/CREAT SERPL: 13.5 (ref 7–25)
CALCIUM SPEC-SCNC: 9.6 MG/DL (ref 8.6–10.5)
CHLORIDE SERPL-SCNC: 100 MMOL/L (ref 98–107)
CHOLEST SERPL-MCNC: 170 MG/DL (ref 0–200)
CO2 SERPL-SCNC: 24 MMOL/L (ref 22–29)
CREAT SERPL-MCNC: 0.89 MG/DL (ref 0.57–1)
EGFRCR SERPLBLD CKD-EPI 2021: 87.4 ML/MIN/1.73
FERRITIN SERPL-MCNC: 39.2 NG/ML (ref 13–150)
GLOBULIN UR ELPH-MCNC: 2.7 GM/DL
GLUCOSE SERPL-MCNC: 75 MG/DL (ref 65–99)
HBA1C MFR BLD: 5.3 % (ref 4.8–5.6)
HDLC SERPL-MCNC: 41 MG/DL (ref 40–60)
IRON 24H UR-MRATE: 71 MCG/DL (ref 37–145)
IRON SATN MFR SERPL: 16 % (ref 20–50)
LDLC SERPL CALC-MCNC: 94 MG/DL (ref 0–100)
LDLC/HDLC SERPL: 2.16 {RATIO}
POTASSIUM SERPL-SCNC: 4.2 MMOL/L (ref 3.5–5.2)
PROT SERPL-MCNC: 7.3 G/DL (ref 6–8.5)
SODIUM SERPL-SCNC: 136 MMOL/L (ref 136–145)
TIBC SERPL-MCNC: 437 MCG/DL (ref 298–536)
TRANSFERRIN SERPL-MCNC: 293 MG/DL (ref 200–360)
TRIGL SERPL-MCNC: 202 MG/DL (ref 0–150)
VIT B12 BLD-MCNC: 411 PG/ML (ref 211–946)
VLDLC SERPL-MCNC: 35 MG/DL (ref 5–40)

## 2024-12-19 LAB
HCV RNA SERPL NAA+PROBE-ACNC: NORMAL IU/ML
REF LAB TEST REF RANGE: NORMAL

## 2024-12-20 NOTE — PROGRESS NOTES
-Blood counts with no anemia, no infection, and normal clotting cells.   -Electrolytes normal, blood sugar normal, liver and kidney function normal.   -No diabetes.   -Thyroid screening is normal.   -Cholesterol is elevated.  -Urine is normal (no signs of infection, blood, or kidney problem).    - Vitamin D level is low.  Recommend Vitamin D3 supplement.  I can prescribe or you may take over the counter.

## 2025-01-14 ENCOUNTER — APPOINTMENT (OUTPATIENT)
Dept: GENERAL RADIOLOGY | Facility: HOSPITAL | Age: 35
End: 2025-01-14
Payer: MEDICAID

## 2025-01-14 ENCOUNTER — HOSPITAL ENCOUNTER (OUTPATIENT)
Facility: HOSPITAL | Age: 35
Discharge: HOME | End: 2025-01-14
Attending: EMERGENCY MEDICINE | Admitting: EMERGENCY MEDICINE
Payer: MEDICAID

## 2025-01-14 VITALS
HEIGHT: 65 IN | WEIGHT: 208.1 LBS | OXYGEN SATURATION: 99 % | TEMPERATURE: 98.2 F | SYSTOLIC BLOOD PRESSURE: 112 MMHG | BODY MASS INDEX: 34.67 KG/M2 | HEART RATE: 101 BPM | RESPIRATION RATE: 16 BRPM | DIASTOLIC BLOOD PRESSURE: 65 MMHG

## 2025-01-14 DIAGNOSIS — S96.911A STRAIN OF RIGHT FOOT, INITIAL ENCOUNTER: Primary | ICD-10-CM

## 2025-01-14 PROCEDURE — G0463 HOSPITAL OUTPT CLINIC VISIT: HCPCS

## 2025-01-14 PROCEDURE — 73630 X-RAY EXAM OF FOOT: CPT

## 2025-01-14 NOTE — DISCHARGE INSTRUCTIONS
Recommend 400 to 600 mg ibuprofen twice daily you can take 1000 mg of Tylenol at noon for the next 4 days    Recommend alternating ice and heat therapy to the right foot to reduce swelling but also encourage good healthy blood flow    Recommend Ace wrap to reduce swelling in the evenings elevation of the right foot.    Wear the walking boot if you must return to work to give stability to your foot and support of the foot and healing    Follow-up with family doctor as needed return to ER for worsening symptoms

## 2025-01-14 NOTE — Clinical Note
[facility] [place] [address] [place] [address] [place] [address]  Phone: [phone]    Amber Gooden was seen and treated in our emergency department on 1/14/2025.  She may return to work on 01/17/2025.         Thank you for choosing [facility].    Rene Vazquez Jr., APRN

## 2025-01-14 NOTE — FSED PROVIDER NOTE
"Subjective   History of Present Illness  34-year-old female reports that she was walking in her driveway yesterday when she fell on her right foot and had her foot extended behind her.  She is reporting pain to the top side of her right foot.  She reports she works at [employer] and has to be on her feet for long periods of time.  She denies past injury or trauma to the right foot.        Review of Systems   All other systems reviewed and are negative.      No past medical history on file.    Allergies   Allergen Reactions    Amoxicillin Other (See Comments)     \"Flu like symptoms\"     Demerol [Meperidine] Other (See Comments)     \"Feels like fire in my veins\"       No past surgical history on file.    No family history on file.    Social History     Socioeconomic History    Marital status: Single   Tobacco Use    Smoking status: Some Days     Types: Cigarettes     Passive exposure: Past    Smokeless tobacco: Former   Vaping Use    Vaping status: Every Day    Substances: Nicotine    Devices: Disposable, Pre-filled or refillable cartridge   Substance and Sexual Activity    Alcohol use: Not Currently    Drug use: Never    Sexual activity: Yes     Partners: Male           Objective   Physical Exam  Vitals and nursing note reviewed.   Constitutional:       Appearance: Normal appearance. She is normal weight.   HENT:      Head: Normocephalic and atraumatic.      Nose: Nose normal.      Mouth/Throat:      Mouth: Mucous membranes are moist.   Eyes:      Extraocular Movements: Extraocular movements intact.      Conjunctiva/sclera: Conjunctivae normal.      Pupils: Pupils are equal, round, and reactive to light.   Cardiovascular:      Rate and Rhythm: Normal rate.      Pulses: Normal pulses.   Pulmonary:      Effort: Pulmonary effort is normal. No respiratory distress.      Breath sounds: Normal breath sounds.   Abdominal:      Palpations: Abdomen is soft.   Musculoskeletal:      Cervical back: Normal range of motion.      " Comments: Patient has tenderness on exam to the right midfoot region the calcaneus the lateral and medial malleoli regions are nontender.   Skin:     Capillary Refill: Capillary refill takes less than 2 seconds.      Comments: Patient has soft tissue swelling to the right midfoot, no obvious bruising   Neurological:      General: No focal deficit present.      Mental Status: She is alert and oriented to person, place, and time.         Procedures           ED Course  ED Course as of 01/14/25 1304   Tue Jan 14, 2025   1145 Right foot x-ray Impression:  Negative for acute osseous abnormality.   [WF]      ED Course User Index  [WF] Rene Vazquez Jr., URSZULA                                           Medical Decision Making  Differential diagnosis would include metatarsal fracture ankle fracture of the right foot or foot strain    X-ray of the right foot is negative for acute bony abnormality suspect foot strain since patient has to work over the next several days I have offered her a supportive cam boot and Ace wrap.  We talked about the need to increase over-the-counter medication with ibuprofen and Tylenol also alternate heat and cool therapy and she is agreeable to discharge.    Problems Addressed:  Strain of right foot, initial encounter: complicated acute illness or injury    Amount and/or Complexity of Data Reviewed  Radiology: ordered.        Final diagnoses:   Strain of right foot, initial encounter       ED Disposition  ED Disposition       ED Disposition   Discharge    Condition   Stable    Comment   --               Cristin Norman, APRN  [address]  [address] [place] [address] [place] [address]  [phone]          Sharif Jones II, MD  [address] [facility] [place] [address]  [phone]               Medication List      No changes were made to your prescriptions during this visit.

## 2025-02-26 ENCOUNTER — HOSPITAL ENCOUNTER (EMERGENCY)
Facility: HOSPITAL | Age: 35
Discharge: LEFT BEFORE DISCHARGE | End: 2025-02-27
Attending: EMERGENCY MEDICINE

## 2025-02-26 VITALS
TEMPERATURE: 99.1 F | WEIGHT: 211.86 LBS | HEART RATE: 99 BPM | BODY MASS INDEX: 35.3 KG/M2 | DIASTOLIC BLOOD PRESSURE: 97 MMHG | RESPIRATION RATE: 18 BRPM | HEIGHT: 65 IN | OXYGEN SATURATION: 99 % | SYSTOLIC BLOOD PRESSURE: 133 MMHG

## 2025-02-26 DIAGNOSIS — Z53.21 ELOPED FROM EMERGENCY DEPARTMENT: Primary | ICD-10-CM

## 2025-02-26 PROCEDURE — 99211 OFF/OP EST MAY X REQ PHY/QHP: CPT

## 2025-02-26 PROCEDURE — 99281 EMR DPT VST MAYX REQ PHY/QHP: CPT

## 2025-02-27 NOTE — ED NOTES
Pt 1:1 with this nurse. Pt evaluated by provider in triage and SI precautions discontinued. Pt does not have active thoughts of harming self at this time.

## 2025-02-27 NOTE — ED PROVIDER NOTES
"Subjective   Provider in Triage Note  34 yof with friend stating she's off her anxiety/depression medications x 3 weeks because she lost her insurance and she was having suicidal thoughts so they took her off her meds and recommended genesight testing. She denies any current hi/si/hallucinations.   No reported w/d symptoms.  Sober x 11 months.    Due to significant overcrowding in the emergency department patient was initially seen and evaluated in triage.  Provider in triage recommended patient placement in the treatment area to initiate therapy and movement to an ER bed as soon as possible.   Orders placed; medications will be deferred to main provider per protocol.        History of Present Illness    Review of Systems    No past medical history on file.    Allergies   Allergen Reactions    Amoxicillin Other (See Comments)     \"Flu like symptoms\"     Demerol [Meperidine] Other (See Comments)     \"Feels like fire in my veins\"       No past surgical history on file.    No family history on file.    Social History     Socioeconomic History    Marital status: Single   Tobacco Use    Smoking status: Some Days     Types: Cigarettes     Passive exposure: Past    Smokeless tobacco: Former   Vaping Use    Vaping status: Every Day    Substances: Nicotine    Devices: Disposable, Pre-filled or refillable cartridge   Substance and Sexual Activity    Alcohol use: Not Currently    Drug use: Never    Sexual activity: Yes     Partners: Male           Objective   Physical Exam    Procedures           ED Course                                                       Medical Decision Making      Final diagnoses:   Eloped from emergency department       ED Disposition  ED Disposition       ED Disposition   Eloped    Condition   --    Comment   --               No follow-up provider specified.       Medication List      No changes were made to your prescriptions during this visit.            Rosy Shi, APRN  02/27/25 0339    "

## 2025-04-24 ENCOUNTER — OFFICE VISIT (OUTPATIENT)
Dept: FAMILY MEDICINE CLINIC | Facility: CLINIC | Age: 35
End: 2025-04-24
Payer: MEDICAID

## 2025-04-24 VITALS
HEART RATE: 87 BPM | BODY MASS INDEX: 34.2 KG/M2 | RESPIRATION RATE: 16 BRPM | TEMPERATURE: 97.7 F | OXYGEN SATURATION: 98 % | HEIGHT: 65 IN | SYSTOLIC BLOOD PRESSURE: 120 MMHG | WEIGHT: 205.3 LBS | DIASTOLIC BLOOD PRESSURE: 76 MMHG

## 2025-04-24 DIAGNOSIS — N89.8 ITCHING IN THE VAGINAL AREA: ICD-10-CM

## 2025-04-24 DIAGNOSIS — N76.0 ACUTE VAGINITIS: Primary | ICD-10-CM

## 2025-04-24 RX ORDER — FLUCONAZOLE 100 MG/1
100 TABLET ORAL DAILY
Qty: 3 TABLET | Refills: 0 | Status: SHIPPED | OUTPATIENT
Start: 2025-04-24 | End: 2025-04-27

## 2025-04-24 RX ORDER — CLOTRIMAZOLE 1 %
CREAM WITH APPLICATOR VAGINAL DAILY
Qty: 45 G | Refills: 0 | Status: SHIPPED | OUTPATIENT
Start: 2025-04-24

## 2025-04-24 NOTE — PROGRESS NOTES
Silvia Gooden is a 34 y.o. female.     History of Present Illness     History of Present Illness  The patient is a 35-year-old female who presents with a complaint of vaginal itching.  She initially experienced vaginal discharge, which was interpreted as a potential yeast infection due to her predisposition to such conditions. In an attempt to preempt the onset of itching, burning, and other associated discomforts, Diflucan was self-administered last night ( previous left over prescription). However, this resulted in severe itching, disrupting sleep and necessitating a bath for relief. Upon awakening this morning, the itching resumed. The itching is described as internal. No rash at groin/genital area. She denies dysuria, urinary frequency and urgency.    The following portions of the patient's history were reviewed and updated as appropriate: past medical history, past social history, past surgical history and problem list.    Review of Systems   Genitourinary:  Negative for dysuria, frequency and urgency.        Vaginal itching.         Objective   Physical Exam  Vitals reviewed.   Genitourinary:     Vagina: No vaginal discharge, erythema or lesions.   Neurological:      Mental Status: She is alert.         Vitals:    04/24/25 1502   BP: 120/76   Pulse: 87   Resp: 16   Temp: 97.7 °F (36.5 °C)   SpO2: 98%     Current Outpatient Medications on File Prior to Visit   Medication Sig Dispense Refill    cetirizine (zyrTEC) 10 MG tablet Take 1 tablet by mouth As Needed.      cloNIDine (CATAPRES) 0.1 MG tablet Take 1 tablet by mouth every night at bedtime.      docusate sodium (COLACE) 100 MG capsule Take 1 capsule by mouth 2 (Two) Times a Day As Needed for Constipation.      hydrOXYzine pamoate (VISTARIL) 50 MG capsule Take 1 capsule by mouth 3 times a day.      prazosin (MINIPRESS) 2 MG capsule Take 1 capsule by mouth every night at bedtime.       No current facility-administered medications on file prior to  visit.         Assessment & Plan   Problems Addressed this Visit       Acute vaginitis - Primary    Relevant Medications    clotrimazole (LOTRIMIN) 1 % vaginal cream    fluconazole (Diflucan) 100 MG tablet    Itching in the vaginal area    Relevant Medications    clotrimazole (LOTRIMIN) 1 % vaginal cream    fluconazole (Diflucan) 100 MG tablet     Diagnoses         Codes Comments      Acute vaginitis    -  Primary ICD-10-CM: N76.0  ICD-9-CM: 616.10       Itching in the vaginal area     ICD-10-CM: N89.8  ICD-9-CM: 698.1                  Patient or patient representative verbalized consent for the use of Ambient Listening during the visit with  Lynn Wayne MD for chart documentation. 4/27/2025  15:16 EDT

## 2025-05-09 ENCOUNTER — HOSPITAL ENCOUNTER (OUTPATIENT)
Facility: HOSPITAL | Age: 35
Discharge: HOME | End: 2025-05-09
Attending: EMERGENCY MEDICINE | Admitting: EMERGENCY MEDICINE
Payer: MEDICAID

## 2025-05-09 VITALS
HEIGHT: 65 IN | BODY MASS INDEX: 34.2 KG/M2 | RESPIRATION RATE: 16 BRPM | DIASTOLIC BLOOD PRESSURE: 84 MMHG | SYSTOLIC BLOOD PRESSURE: 123 MMHG | OXYGEN SATURATION: 100 % | HEART RATE: 93 BPM | TEMPERATURE: 98.4 F | WEIGHT: 205.25 LBS

## 2025-05-09 DIAGNOSIS — J06.9 VIRAL URI WITH COUGH: Primary | ICD-10-CM

## 2025-05-09 PROCEDURE — 87636 SARSCOV2 & INF A&B AMP PRB: CPT

## 2025-05-09 PROCEDURE — G0463 HOSPITAL OUTPT CLINIC VISIT: HCPCS

## 2025-05-09 PROCEDURE — 99213 OFFICE O/P EST LOW 20 MIN: CPT

## 2025-05-09 PROCEDURE — 87651 STREP A DNA AMP PROBE: CPT

## 2025-05-09 RX ORDER — FLUTICASONE PROPIONATE 50 MCG
2 SPRAY, SUSPENSION (ML) NASAL DAILY
Qty: 18.2 G | Refills: 0 | Status: SHIPPED | OUTPATIENT
Start: 2025-05-09

## 2025-05-09 RX ORDER — CETIRIZINE HYDROCHLORIDE, PSEUDOEPHEDRINE HYDROCHLORIDE 5; 120 MG/1; MG/1
1 TABLET, FILM COATED, EXTENDED RELEASE ORAL 2 TIMES DAILY
Qty: 14 TABLET | Refills: 0 | Status: SHIPPED | OUTPATIENT
Start: 2025-05-09 | End: 2025-05-16

## 2025-05-09 NOTE — Clinical Note
[facility] [place] [id number] [place] [address] [place] [address]  Phone: [phone]    Amber Gooden was seen and treated in our emergency department on 5/9/2025.  She may return to work on 05/10/2025.         Thank you for choosing [facility].    Rene Vazquez Jr., APRN

## 2025-05-10 NOTE — FSED PROVIDER NOTE
"Subjective   History of Present Illness  44-year-old female reports a 1 to 2-day history of nasal congestion sore throat.  Reports that her significant other has had similar symptoms.  Denies chest pain shortness of breath vomiting and diarrhea.        Review of Systems   All other systems reviewed and are negative.      No past medical history on file.    Allergies   Allergen Reactions    Amoxicillin Other (See Comments)     \"Flu like symptoms\"     Demerol [Meperidine] Other (See Comments)     \"Feels like fire in my veins\"       No past surgical history on file.    No family history on file.    Social History     Socioeconomic History    Marital status: Single   Tobacco Use    Smoking status: Some Days     Types: Cigarettes     Passive exposure: Past    Smokeless tobacco: Former   Vaping Use    Vaping status: Every Day    Substances: Nicotine    Devices: Disposable, Pre-filled or refillable cartridge   Substance and Sexual Activity    Alcohol use: Not Currently    Drug use: Never    Sexual activity: Yes     Partners: Male           Objective   Physical Exam  Vitals and nursing note reviewed.   Constitutional:       General: She is not in acute distress.     Appearance: Normal appearance. She is well-developed. She is not ill-appearing or toxic-appearing.   HENT:      Head: Normocephalic and atraumatic.      Right Ear: Ear canal and external ear normal.      Left Ear: Ear canal and external ear normal.      Ears:      Comments: Left tympanic membrane slightly swollen right TM is normal no evidence of otitis externa     Nose: Nose normal.      Mouth/Throat:      Mouth: Mucous membranes are moist.      Pharynx: Oropharynx is clear.   Eyes:      Extraocular Movements: Extraocular movements intact.      Conjunctiva/sclera: Conjunctivae normal.      Pupils: Pupils are equal, round, and reactive to light.   Cardiovascular:      Rate and Rhythm: Normal rate and regular rhythm.      Pulses: Normal pulses.      Heart sounds: " Normal heart sounds.   Pulmonary:      Effort: Pulmonary effort is normal. No respiratory distress.      Breath sounds: Normal breath sounds. No stridor. No wheezing, rhonchi or rales.   Chest:      Chest wall: No tenderness.   Abdominal:      General: Abdomen is flat. Bowel sounds are normal.      Palpations: Abdomen is soft.   Musculoskeletal:         General: Normal range of motion.      Cervical back: Normal range of motion and neck supple.   Skin:     General: Skin is warm.      Capillary Refill: Capillary refill takes less than 2 seconds.   Neurological:      General: No focal deficit present.      Mental Status: She is alert and oriented to person, place, and time. Mental status is at baseline.         Procedures           ED Course  ED Course as of 05/09/25 2211   Fri May 09, 2025   2045 COVID influenza and strep are negative [WF]      ED Course User Index  [WF] Rene Vazquez Jr., APRN                                           Medical Decision Making  Suspect viral URI will discharge home with Flonase and Zyrtec-D    Problems Addressed:  Viral URI with cough: acute illness or injury    Risk  OTC drugs.        Final diagnoses:   Viral URI with cough       ED Disposition  ED Disposition       ED Disposition   Discharge    Condition   Stable    Comment   --               Cristin Norman, APRN  [address]  [address]  [place] [facility]  [phone]               Medication List        New Prescriptions      cetirizine-pseudoephedrine 5-120 MG per 12 hr tablet  Commonly known as: ZyrTEC-D  Take 1 tablet by mouth 2 (Two) Times a Day for 7 days.     fluticasone 50 MCG/ACT nasal spray  Commonly known as: FLONASE  Administer 2 sprays into the nostril(s) as directed by provider Daily.               Where to Get Your Medications        These medications were sent to [id number] - [place], [place] - [address] - [phone] [place] [phone]   [address] [place] [address]       Phone: [phone]   cetirizine-pseudoephedrine 5-120 MG per 12 hr tablet  fluticasone 50 MCG/ACT nasal spray

## 2025-05-10 NOTE — DISCHARGE INSTRUCTIONS
Alternate Tylenol and ibuprofen as needed for body aches and fever    Begin Flonase and Zyrtec-D to help with nasal congestion and postnasal drainage    Increase fluid intake    Follow-up with family doctor as needed return to ER for worsening symptoms

## 2025-05-28 ENCOUNTER — OFFICE VISIT (OUTPATIENT)
Dept: FAMILY MEDICINE CLINIC | Facility: CLINIC | Age: 35
End: 2025-05-28
Payer: MEDICAID

## 2025-05-28 VITALS
OXYGEN SATURATION: 98 % | BODY MASS INDEX: 34.12 KG/M2 | TEMPERATURE: 98 F | RESPIRATION RATE: 16 BRPM | SYSTOLIC BLOOD PRESSURE: 117 MMHG | WEIGHT: 204.8 LBS | HEART RATE: 97 BPM | HEIGHT: 65 IN | DIASTOLIC BLOOD PRESSURE: 58 MMHG

## 2025-05-28 DIAGNOSIS — Z31.9 DESIRE FOR PREGNANCY: ICD-10-CM

## 2025-05-28 DIAGNOSIS — Z00.00 HEALTHCARE MAINTENANCE: Primary | ICD-10-CM

## 2025-05-28 NOTE — PROGRESS NOTES
"Chief Complaint  Pelvic Pain (Whole week before period had a stabbing pain, normal period. )    Subjective        Amber Gooden presents to [facility]  History of Present Illness  35 y/o  presents to  office to inquire about fertility issues.  She reports not using birth control since she was 19 years old. She reports that her and her significant other are trying to get pregnancy.  She reports her menses is regular.    LMP 5/20/25      Vaginal Discharge  The patient's primary symptoms include vaginal discharge. The patient's pertinent negatives include no genital odor or genital rash. This is a recurrent problem. The current episode started more than 1 month ago. The problem occurs intermittently. The problem has been unchanged. The patient is experiencing no pain. Pertinent negatives include no abdominal pain, back pain, chills, discolored urine, fever or flank pain. The vaginal discharge was thick and white. There has been no bleeding. She has not been passing clots. She has not been passing tissue. Nothing aggravates the symptoms. She has tried antifungals and antibiotics for the symptoms. The treatment provided no relief. She is sexually active. She uses nothing for contraception. Her menstrual history has been regular. Her past medical history is significant for endometriosis and vaginosis. The maximum temperature recorded prior to her arrival was no fever.       Objective   Vital Signs:  /58 (BP Location: Left arm, Patient Position: Sitting, Cuff Size: Large Adult)   Pulse 97   Temp 98 °F (36.7 °C) (Infrared)   Resp 16   Ht 165.1 cm (65\")   Wt 92.9 kg (204 lb 12.8 oz)   SpO2 98%   BMI 34.08 kg/m²   Estimated body mass index is 34.08 kg/m² as calculated from the following:    Height as of this encounter: 165.1 cm (65\").    Weight as of this encounter: 92.9 kg (204 lb 12.8 oz).    Physical Exam  Constitutional:       General: She is not in acute distress.     " Appearance: Normal appearance. She is obese. She is not ill-appearing, toxic-appearing or diaphoretic.   HENT:      Head: Normocephalic.   Cardiovascular:      Rate and Rhythm: Normal rate.   Pulmonary:      Effort: Pulmonary effort is normal.   Neurological:      General: No focal deficit present.      Mental Status: She is alert and oriented to person, place, and time.      Motor: No weakness.      Gait: Gait normal.   Psychiatric:         Mood and Affect: Mood normal.         Behavior: Behavior normal.         Thought Content: Thought content normal.         Judgment: Judgment normal.        Result Review :  The following data was reviewed by: URSZULA Ornelas on 05/28/2025:  CMP          11/26/2024    18:50 12/17/2024    12:07   CMP   Glucose 88  75    BUN 13  12    Creatinine 0.74  0.89    EGFR 109.0  87.4    Sodium 137  136    Potassium 3.6  4.2    Chloride 103  100    Calcium 9.5  9.6    Total Protein 7.0  7.3    Albumin 4.5  4.6    Globulin 2.5  2.7    Total Bilirubin <0.2  0.3    Alkaline Phosphatase 50  47    AST (SGOT) 18  34    ALT (SGPT) 34  44    Albumin/Globulin Ratio 1.8  1.7    BUN/Creatinine Ratio 17.6  13.5    Anion Gap 9.1  12.0      CBC w/diff          11/26/2024    18:50 12/17/2024    12:07   CBC w/Diff   WBC 13.35  8.56    RBC 4.42  4.64    Hemoglobin 12.7  13.5    Hematocrit 39.6  41.0    MCV 89.6  88.4    MCH 28.7  29.1    MCHC 32.1  32.9    RDW 12.8  12.4    Platelets 346  280    Neutrophil Rel % 73.9  61.5    Immature Granulocyte Rel % 0.2  0.4    Lymphocyte Rel % 16.6  23.9    Monocyte Rel % 6.9  10.0    Eosinophil Rel % 2.2  3.7    Basophil Rel % 0.2  0.5      Lipid Panel          12/17/2024    12:07   Lipid Panel   Total Cholesterol 170    Triglycerides 202    HDL Cholesterol 41    VLDL Cholesterol 35    LDL Cholesterol  94    LDL/HDL Ratio 2.16        Most Recent A1C          12/17/2024    12:07   HGBA1C Most Recent   Hemoglobin A1C 5.30      A1C Last 3 Results          12/17/2024     12:07   HGBA1C Last 3 Results   Hemoglobin A1C 5.30      UA          11/26/2024    17:57 12/17/2024    12:07   Urinalysis   Squamous Epithelial Cells, UA 3-6     Specific Gravity, UA 1.025  1.007    Ketones, UA Negative  Negative    Blood, UA Negative  Negative    Leukocytes, UA Negative  Negative    Nitrite, UA Negative  Negative    RBC, UA None Seen     WBC, UA 0-2     Bacteria, UA None Seen                  Assessment and Plan   Diagnoses and all orders for this visit:    1. Healthcare maintenance (Primary)  Assessment & Plan:  RTO in 6 months for annual exam.  Establish with GYN.  MAY inquire with [facility].    [facility] - [phone]    [address], [address]  [place] [address]    Orders:  -     Ambulatory Referral to Gynecology    2. Desire for pregnancy  Assessment & Plan:  [facility] - [phone]    [address], [address]  [place] [id number]    Orders:  -     Ambulatory Referral to Gynecology  -     Urinalysis With Culture If Indicated - Urine, Clean Catch; Future  -     Chlamydia trachomatis, Neisseria gonorrhoeae, Trichomonas vaginalis, PCR - Urine, Urine, Clean Catch  -     Pregnancy, Urine - Urine, Clean Catch; Future  -     ARSALAN Prep - Swab, Vagina; Future  -     Wet Prep, Genital - Swab, Vagina; Future    Other orders  -     Cancel: Ambulatory Referral to Behavioral Health  -     Cancel: [facility]Sight - Swab,; Future           I spent 30 minutes caring for  on this date of service. This time includes time spent by me in the following activities:preparing for the visit, reviewing tests, obtaining and/or reviewing a separately obtained history, counseling and educating the patient/family/caregiver, ordering medications, tests, or procedures, referring and communicating with other health care professionals , and documenting information in the medical record  Follow Up   Return in about 2 weeks (around 6/11/2025) for  discuss lab results.  Patient was given instructions and counseling regarding her condition or for health maintenance advice. Please see specific information pulled into the AVS if appropriate.

## 2025-05-28 NOTE — ASSESSMENT & PLAN NOTE
[facility] - [phone] [address], [address]  [place] [address]

## 2025-05-28 NOTE — PATIENT INSTRUCTIONS
[facility] - [phone] [address], [address]  [place] [address]

## 2025-05-28 NOTE — ASSESSMENT & PLAN NOTE
RTO in 6 months for annual exam.  Establish with GYN.  MAY inquire with [facility].    [facility] - [phone] [address], [address]  [place] [address]

## 2025-05-29 DIAGNOSIS — Z31.9 DESIRE FOR PREGNANCY: ICD-10-CM

## 2025-05-29 DIAGNOSIS — Z00.00 HEALTHCARE MAINTENANCE: ICD-10-CM

## 2025-05-29 LAB
B-HCG UR QL: NEGATIVE
BACTERIA UR QL AUTO: ABNORMAL /HPF
BILIRUB UR QL STRIP: NEGATIVE
C TRACH DNA SPEC QL NAA+PROBE: NOT DETECTED
CLARITY UR: CLEAR
CLUE CELLS SPEC QL WET PREP: ABNORMAL
COLOR UR: YELLOW
GLUCOSE UR STRIP-MCNC: NEGATIVE MG/DL
HGB UR QL STRIP.AUTO: NEGATIVE
HOLD SPECIMEN: NORMAL
HYALINE CASTS UR QL AUTO: ABNORMAL /LPF
HYDATID CYST SPEC WET PREP: ABNORMAL
KETONES UR QL STRIP: NEGATIVE
LEUKOCYTE ESTERASE UR QL STRIP.AUTO: ABNORMAL
N GONORRHOEA RRNA SPEC QL NAA+PROBE: NOT DETECTED
NITRITE UR QL STRIP: NEGATIVE
PH UR STRIP.AUTO: 7 [PH] (ref 5–8)
PROT UR QL STRIP: NEGATIVE
RBC # UR STRIP: ABNORMAL /HPF
REF LAB TEST METHOD: ABNORMAL
SP GR UR STRIP: 1.01 (ref 1–1.03)
SQUAMOUS #/AREA URNS HPF: ABNORMAL /HPF
T VAGINALIS SPEC QL WET PREP: ABNORMAL
TRICHOMONAS VAGINALIS PCR: NOT DETECTED
UROBILINOGEN UR QL STRIP: ABNORMAL
WBC # UR STRIP: ABNORMAL /HPF
WBC SPEC QL WET PREP: ABNORMAL
YEAST GENITAL QL WET PREP: ABNORMAL

## 2025-05-29 PROCEDURE — 87661 TRICHOMONAS VAGINALIS AMPLIF: CPT | Performed by: NURSE PRACTITIONER

## 2025-05-29 PROCEDURE — 87210 SMEAR WET MOUNT SALINE/INK: CPT | Performed by: NURSE PRACTITIONER

## 2025-05-29 PROCEDURE — 87491 CHLMYD TRACH DNA AMP PROBE: CPT | Performed by: NURSE PRACTITIONER

## 2025-05-29 PROCEDURE — 81025 URINE PREGNANCY TEST: CPT | Performed by: NURSE PRACTITIONER

## 2025-05-29 PROCEDURE — 81001 URINALYSIS AUTO W/SCOPE: CPT | Performed by: NURSE PRACTITIONER

## 2025-05-29 PROCEDURE — 87591 N.GONORRHOEAE DNA AMP PROB: CPT | Performed by: NURSE PRACTITIONER

## 2025-06-02 DIAGNOSIS — B96.89 BV (BACTERIAL VAGINOSIS): Primary | ICD-10-CM

## 2025-06-02 DIAGNOSIS — Z78.9 ANTIFUNGAL PROPHYLAXIS RECOMMENDED: Primary | ICD-10-CM

## 2025-06-02 DIAGNOSIS — N76.0 BV (BACTERIAL VAGINOSIS): Primary | ICD-10-CM

## 2025-06-02 RX ORDER — MOXIFLOXACIN HYDROCHLORIDE 400 MG/1
400 TABLET ORAL DAILY
Qty: 7 TABLET | Refills: 0 | Status: SHIPPED | OUTPATIENT
Start: 2025-06-02 | End: 2025-06-09

## 2025-06-02 RX ORDER — FLUCONAZOLE 200 MG/1
200 TABLET ORAL DAILY
Qty: 7 TABLET | Refills: 0 | Status: SHIPPED | OUTPATIENT
Start: 2025-06-02 | End: 2025-06-09

## 2025-06-02 RX ORDER — DOXYCYCLINE 100 MG/1
100 CAPSULE ORAL 2 TIMES DAILY
Qty: 14 CAPSULE | Refills: 0 | Status: SHIPPED | OUTPATIENT
Start: 2025-06-02 | End: 2025-06-09

## 2025-07-28 ENCOUNTER — HOSPITAL ENCOUNTER (OUTPATIENT)
Facility: HOSPITAL | Age: 35
Discharge: HOME | End: 2025-07-28
Attending: EMERGENCY MEDICINE | Admitting: EMERGENCY MEDICINE
Payer: MEDICAID

## 2025-07-28 VITALS
OXYGEN SATURATION: 98 % | HEIGHT: 65 IN | SYSTOLIC BLOOD PRESSURE: 116 MMHG | TEMPERATURE: 98.9 F | HEART RATE: 89 BPM | BODY MASS INDEX: 33.99 KG/M2 | WEIGHT: 204 LBS | DIASTOLIC BLOOD PRESSURE: 79 MMHG | RESPIRATION RATE: 16 BRPM

## 2025-07-28 DIAGNOSIS — J06.9 VIRAL URI WITH COUGH: Primary | ICD-10-CM

## 2025-07-28 LAB
FLUAV SUBTYP SPEC NAA+PROBE: NOT DETECTED
FLUBV RNA NPH QL NAA+NON-PROBE: NOT DETECTED
SARS-COV-2 RNA RESP QL NAA+PROBE: NOT DETECTED
STREP A PCR: NOT DETECTED

## 2025-07-28 PROCEDURE — G0463 HOSPITAL OUTPT CLINIC VISIT: HCPCS | Performed by: NURSE PRACTITIONER

## 2025-07-28 PROCEDURE — 87636 SARSCOV2 & INF A&B AMP PRB: CPT

## 2025-07-28 PROCEDURE — 87651 STREP A DNA AMP PROBE: CPT

## 2025-07-28 RX ORDER — CETIRIZINE HYDROCHLORIDE, PSEUDOEPHEDRINE HYDROCHLORIDE 5; 120 MG/1; MG/1
1 TABLET, FILM COATED, EXTENDED RELEASE ORAL 2 TIMES DAILY
Qty: 10 TABLET | Refills: 0 | Status: SHIPPED | OUTPATIENT
Start: 2025-07-28 | End: 2025-08-02

## 2025-07-28 NOTE — FSED PROVIDER NOTE
"Subjective   History of Present Illness  34-year-old female presents with URI, sore throat and cough.  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  None of them were diagnosed with anything.  Patient presents today to see if she can get some answers for her symptoms.  Patient reports that she is 16 months clean.    History provided by:  Patient   used: No        Review of Systems    History reviewed. No pertinent past medical history.    Allergies   Allergen Reactions    Amoxicillin Other (See Comments)     \"Flu like symptoms\"     Demerol [Meperidine] Other (See Comments)     \"Feels like fire in my veins\"       History reviewed. No pertinent surgical history.    History reviewed. No pertinent family history.    Social History     Socioeconomic History    Marital status: Single   Tobacco Use    Smoking status: Some Days     Types: Cigarettes     Passive exposure: Past    Smokeless tobacco: Former   Vaping Use    Vaping status: Every Day    Substances: Nicotine    Devices: Disposable, Pre-filled or refillable cartridge   Substance and Sexual Activity    Alcohol use: Not Currently    Drug use: Never    Sexual activity: Yes     Partners: Male           Objective   Physical Exam  Vitals and nursing note reviewed.   Constitutional:       General: She is not in acute distress.     Appearance: She is well-developed. She is not ill-appearing, toxic-appearing or diaphoretic.   HENT:      Right Ear: Tympanic membrane and ear canal normal. No drainage, swelling or tenderness. No middle ear effusion. Tympanic membrane is not erythematous.      Left Ear: Tympanic membrane and ear canal normal. No drainage, swelling or tenderness.  No middle ear effusion. Tympanic membrane is not erythematous.      Nose: Congestion and rhinorrhea present.      Mouth/Throat:      Mouth: Mucous membranes are moist. No oral lesions.      Pharynx: Oropharynx is clear. " No pharyngeal swelling, oropharyngeal exudate, posterior oropharyngeal erythema or uvula swelling.      Tonsils: No tonsillar exudate or tonsillar abscesses.   Cardiovascular:      Rate and Rhythm: Normal rate and regular rhythm.      Heart sounds: Normal heart sounds. No murmur heard.     No friction rub. No gallop.   Pulmonary:      Effort: Pulmonary effort is normal. No respiratory distress.      Breath sounds: Normal breath sounds. No stridor. No wheezing, rhonchi or rales.   Skin:     General: Skin is warm and dry.      Capillary Refill: Capillary refill takes less than 2 seconds.   Neurological:      Mental Status: She is alert and oriented to person, place, and time.   Psychiatric:         Mood and Affect: Mood normal.         Behavior: Behavior normal.         Procedures           ED Course  ED Course as of 07/28/25 2016 Mon Jul 28, 2025 1958 COVID19: Not Detected []   1958 Influenza A PCR: Not Detected []   1958 Influenza B PCR: Not Detected []   1958 STREP A PCR: Not Detected []      ED Course User Index  [SJ] Christina Mckinley APRN                                           Medical Decision Making  34-year-old female presents with URI, sore throat and cough.  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  None of them were diagnosed with anything.  Patient presents today to see if she can get some answers for her symptoms.  Patient reports that she is 16 months clean.  Differential diagnoses include COVID, flu, strep, pneumonia.  Patient is afebrile, I do not suspect that she has pneumonia.  If her symptoms worsen she can follow-up and be rechecked.  Her testing today was negative for COVID flu and strep.  I have advised over-the-counter decongestant.  Patient reports that she will need a prescription because she is not permitted to buy that over-the-counter.  Prescription has been sent for decongestant twice a day for 5 days.  I have  advised lots of fluids and Tylenol and ibuprofen as needed for pain.  Reasons for return were discussed with the patient and she verbalized understanding.    Amount and/or Complexity of Data Reviewed  Labs:  Decision-making details documented in ED Course.    Risk  OTC drugs.        Final diagnoses:   Viral URI with cough       ED Disposition  ED Disposition       ED Disposition   Discharge    Condition   Stable    Comment   --               Cristin Norman, APRN  [address]  [address]  [place] [address] [phone]    Call            Medication List        Changed      * cetirizine-pseudoephedrine 5-120 MG per 12 hr tablet  Commonly known as: ZyrTEC-D  Take 1 tablet by mouth 2 (Two) Times a Day for 7 days.  What changed: Another medication with the same name was added. Make sure you understand how and when to take each.     * cetirizine-pseudoephedrine 5-120 MG per 12 hr tablet  Commonly known as: ZyrTEC-D  Take 1 tablet by mouth 2 (Two) Times a Day for 5 days.  What changed: You were already taking a medication with the same name, and this prescription was added. Make sure you understand how and when to take each.           * This list has 2 medication(s) that are the same as other medications prescribed for you. Read the directions carefully, and ask your doctor or other care provider to review them with you.                   Where to Get Your Medications        These medications were sent to [id number] [place] - [address] AT [address] [place] - [phone] [facility] [phone] FX  [address] [place] [address]      Phone: [phone]   cetirizine-pseudoephedrine 5-120 MG per 12 hr tablet

## 2025-07-29 NOTE — DISCHARGE INSTRUCTIONS
Take Zyrtec-D as scheduled twice a day.    Be sure to drink plenty of fluids.    Take Tylenol and ibuprofen as needed for fever or headache.    Return to the emergency department for worsening symptoms, coughing up blood, vomiting up blood or having bloody diarrhea, chest pain or shortness of breath